# Patient Record
Sex: FEMALE | Race: BLACK OR AFRICAN AMERICAN
[De-identification: names, ages, dates, MRNs, and addresses within clinical notes are randomized per-mention and may not be internally consistent; named-entity substitution may affect disease eponyms.]

---

## 2019-08-24 ENCOUNTER — HOSPITAL ENCOUNTER (EMERGENCY)
Dept: HOSPITAL 47 - EC | Age: 29
LOS: 1 days | Discharge: HOME | End: 2019-08-25
Payer: COMMERCIAL

## 2019-08-24 VITALS — RESPIRATION RATE: 18 BRPM

## 2019-08-24 DIAGNOSIS — Z87.442: ICD-10-CM

## 2019-08-24 DIAGNOSIS — F31.9: ICD-10-CM

## 2019-08-24 DIAGNOSIS — F17.200: ICD-10-CM

## 2019-08-24 DIAGNOSIS — R10.812: ICD-10-CM

## 2019-08-24 DIAGNOSIS — F41.9: ICD-10-CM

## 2019-08-24 DIAGNOSIS — Z79.899: ICD-10-CM

## 2019-08-24 DIAGNOSIS — R10.9: Primary | ICD-10-CM

## 2019-08-24 DIAGNOSIS — R11.2: ICD-10-CM

## 2019-08-24 DIAGNOSIS — R68.83: ICD-10-CM

## 2019-08-24 LAB
ALBUMIN SERPL-MCNC: 4.9 G/DL (ref 3.5–5)
ALP SERPL-CCNC: 116 U/L (ref 38–126)
ALT SERPL-CCNC: 17 U/L (ref 9–52)
AMYLASE SERPL-CCNC: 94 U/L (ref 30–110)
ANION GAP SERPL CALC-SCNC: 13 MMOL/L
AST SERPL-CCNC: 29 U/L (ref 14–36)
BASOPHILS # BLD AUTO: 0 K/UL (ref 0–0.2)
BASOPHILS NFR BLD AUTO: 1 %
BUN SERPL-SCNC: 7 MG/DL (ref 7–17)
CALCIUM SPEC-MCNC: 9.9 MG/DL (ref 8.4–10.2)
CHLORIDE SERPL-SCNC: 107 MMOL/L (ref 98–107)
CO2 SERPL-SCNC: 21 MMOL/L (ref 22–30)
EOSINOPHIL # BLD AUTO: 0 K/UL (ref 0–0.7)
EOSINOPHIL NFR BLD AUTO: 1 %
ERYTHROCYTE [DISTWIDTH] IN BLOOD BY AUTOMATED COUNT: 4.61 M/UL (ref 3.8–5.4)
ERYTHROCYTE [DISTWIDTH] IN BLOOD: 19.3 % (ref 11.5–15.5)
GLUCOSE SERPL-MCNC: 130 MG/DL (ref 74–99)
HCT VFR BLD AUTO: 34.3 % (ref 34–46)
HGB BLD-MCNC: 10.5 GM/DL (ref 11.4–16)
KETONES UR QL STRIP.AUTO: (no result)
LYMPHOCYTES # SPEC AUTO: 0.5 K/UL (ref 1–4.8)
LYMPHOCYTES NFR SPEC AUTO: 11 %
MCH RBC QN AUTO: 22.7 PG (ref 25–35)
MCHC RBC AUTO-ENTMCNC: 30.5 G/DL (ref 31–37)
MCV RBC AUTO: 74.4 FL (ref 80–100)
MONOCYTES # BLD AUTO: 0.2 K/UL (ref 0–1)
MONOCYTES NFR BLD AUTO: 3 %
NEUTROPHILS # BLD AUTO: 3.9 K/UL (ref 1.3–7.7)
NEUTROPHILS NFR BLD AUTO: 82 %
PH UR: 7 [PH] (ref 5–8)
PLATELET # BLD AUTO: 328 K/UL (ref 150–450)
POTASSIUM SERPL-SCNC: 4 MMOL/L (ref 3.5–5.1)
PROT SERPL-MCNC: 9 G/DL (ref 6.3–8.2)
PROT UR QL: (no result)
RBC UR QL: 8 /HPF (ref 0–5)
SODIUM SERPL-SCNC: 141 MMOL/L (ref 137–145)
SP GR UR: 1.02 (ref 1–1.03)
SQUAMOUS UR QL AUTO: 5 /HPF (ref 0–4)
UROBILINOGEN UR QL STRIP: <2 MG/DL (ref ?–2)
WBC # BLD AUTO: 4.7 K/UL (ref 3.8–10.6)
WBC #/AREA URNS HPF: 1 /HPF (ref 0–5)

## 2019-08-24 PROCEDURE — 81001 URINALYSIS AUTO W/SCOPE: CPT

## 2019-08-24 PROCEDURE — 36415 COLL VENOUS BLD VENIPUNCTURE: CPT

## 2019-08-24 PROCEDURE — 85025 COMPLETE CBC W/AUTO DIFF WBC: CPT

## 2019-08-24 PROCEDURE — 74018 RADEX ABDOMEN 1 VIEW: CPT

## 2019-08-24 PROCEDURE — 80053 COMPREHEN METABOLIC PANEL: CPT

## 2019-08-24 PROCEDURE — 99284 EMERGENCY DEPT VISIT MOD MDM: CPT

## 2019-08-24 PROCEDURE — 96374 THER/PROPH/DIAG INJ IV PUSH: CPT

## 2019-08-24 PROCEDURE — 83690 ASSAY OF LIPASE: CPT

## 2019-08-24 PROCEDURE — 82150 ASSAY OF AMYLASE: CPT

## 2019-08-24 PROCEDURE — 74176 CT ABD & PELVIS W/O CONTRAST: CPT

## 2019-08-24 PROCEDURE — 96376 TX/PRO/DX INJ SAME DRUG ADON: CPT

## 2019-08-24 PROCEDURE — 96361 HYDRATE IV INFUSION ADD-ON: CPT

## 2019-08-24 PROCEDURE — 96375 TX/PRO/DX INJ NEW DRUG ADDON: CPT

## 2019-08-24 NOTE — CT
EXAM:

  CT Abdomen and Pelvis Without Intravenous Contrast

 

CLINICAL HISTORY:

  ITS.REASON CT Reason: Pain

 

TECHNIQUE:

  Axial computed tomography images of the abdomen and pelvis without 

intravenous contrast.  CTDI is 6 mGy and DLP is 310 mGy-cm.  This CT exam 

was performed using one or more of the following dose reduction 

techniques: automated exposure control, adjustment of the mA and/or kV 

according to patient size, and/or use of iterative reconstruction 

technique.

 

COMPARISON:

  No relevant prior studies available.

 

FINDINGS:

  Lung bases:  Unremarkable.  No mass.  No consolidation.

 

 ABDOMEN:

  Liver:  Unremarkable.

  Gallbladder and bile ducts:  Unremarkable.  No calcified stones.  No 

ductal dilation.

  Pancreas:  Unremarkable.  No ductal dilation.

  Spleen:  Unremarkable.  No splenomegaly.

  Adrenals:  Unremarkable.  No mass.

  Kidneys and ureters:  Dense medullary pyramids bilaterally.  No 

obstructing stones.  No hydronephrosis.

  Stomach and bowel:  Unremarkable.  No obstruction.  No mucosal 

thickening.

 

 PELVIS:

  Appendix:  No findings to suggest acute appendicitis.

  Bladder:  Unremarkable.  No stones.

  Reproductive:  Unremarkable as visualized.

 

 ABDOMEN and PELVIS:

  Intraperitoneal space:  Unremarkable.  No free air.  No significant 

fluid collection.

  Bones/joints:  No acute fracture.  No dislocation.

  Soft tissues:  Unremarkable.

  Vasculature:  Unremarkable.  No abdominal aortic aneurysm.

  Lymph nodes:  Unremarkable.  No enlarged lymph nodes.

 

IMPRESSION:     

No nephrolithiasis or hydronephrosis.

 

Findings suggestive of medullary nephrocalcinosis.

## 2019-08-24 NOTE — ED
Abdominal Pain HPI





- General


Chief Complaint: Abdominal Pain


Stated Complaint: abdominal pain


Time Seen by Provider: 19 20:04


Source: patient


Mode of arrival: EMS


Limitations: no limitations





- History of Present Illness


Initial Comments: 


29-year-old female patient coming from [facility] presents to 

the emergency department for evaluation of left flank pain.  Patient states that

she has been having symptoms since this morning.  Patient states she has been 

nauseated and has vomited several times.  States she has had chills but no 

fever.  Denies any known hematuria, dysuria, urinary frequency, urinary urgency.

 Patient states she does have history of kidney stones this does feel similar.  

States she is also having pain in the abdomen.  She denies any recent travel or 

sick contacts.  She is currently being treated for alcohol intoxication.  States

she has 15 days sober has been at the rehab facility for the last 5 days. Marcy beaulieu denies any recent rash, shortness breath, chest pain, diarrhea, 

constipation, numbness, tingling, dizziness, weakness, headache, visual changes,

or any other complaints.








- Related Data


                                Home Medications











 Medication  Instructions  Recorded  Confirmed


 


Acetaminophen [Tylenol 8 Hour] 650 mg PO Q4H PRN 19


 


Chlorpheniramine Maleate 4 mg PO Q4HR PRN 19





[Chlor-Trimeton]   


 


Hyoscyamine Sulfate [Levsin] 0.125 mg PO Q6H PRN 19


 


Ibuprofen [Motrin] 600 mg PO Q6H PRN 19


 


Multivitamins, Thera [Multivitamin 1 tab PO DAILY 19





(formulary)]   


 


Mylanta 30 ml PO Q4H PRN 19


 


Ondansetron HCl [Zofran] 8 mg PO Q6H PRN 19


 


Ondansetron [Zofran] 4 mg IM Q6H PRN 19


 


Pantoprazole [Protonix] 40 mg PO DAILY 19


 


Sucralfate [Carafate] 1 gm PO ACHS 19


 


Thiamine [Vitamin B-1] 100 mg PO DAILY 19


 


Tigan 300mg Supp 300 mg RECTAL Q6H PRN 19


 


Trimethobenzamide [Tigan] 300 mg PO Q6H PRN 19


 


busPIRone HCl [Buspar] 10 mg PO TID PRN 19


 


traZODone HCL [Desyrel] 50 - 150 mg PO HS 19











                                    Allergies











Allergy/AdvReac Type Severity Reaction Status Date / Time


 


No Known Allergies Allergy   Verified 19 20:44














Review of Systems


ROS Statement: 


Those systems with pertinent positive or pertinent negative responses have been 

documented in the HPI.





ROS Other: All systems not noted in ROS Statement are negative.





Past Medical History


Additional Past Medical History / Comment(s): kidney stones


History of Any Multi-Drug Resistant Organisms: None Reported


Past Surgical History:  Section


Additional Past Surgical History / Comment(s): kidney stents


Past Psychological History: Anxiety, Bipolar, Depression


Smoking Status: Current every day smoker


Past Alcohol Use History: Rare


Past Drug Use History: Marijuana





General Exam


Limitations: no limitations


General appearance: alert, in no apparent distress, other (Physical well-

developed, well-nourished adult female patient in mild distress related to pain.

  Vital signs upon presentation are temperature 98.9F, pulse 102, blood 

pressure 159/109.  respirations 22, pulse ox 100% on room air.)


ENT exam: Present: normal exam, normal oropharynx, mucous membranes moist


Respiratory exam: Present: normal lung sounds bilaterally.  Absent: respiratory 

distress, wheezes, rales, rhonchi, stridor


Cardiovascular Exam: Present: regular rate, normal rhythm, normal heart sounds. 

 Absent: systolic murmur, diastolic murmur, rubs, gallop, clicks


GI/Abdominal exam: Present: soft, tenderness (Left-sided), normal bowel sounds. 

 Absent: distended, guarding, rebound, rigid


Back exam: Present: normal inspection, CVA tenderness (L).  Absent: CVA 

tenderness (R)


Neurological exam: Present: alert, oriented X3, CN II-XII intact


Psychiatric exam: Present: normal affect, normal mood


Skin exam: Present: warm, dry, intact, normal color.  Absent: rash





Course


                                   Vital Signs











  19





  19:59 21:49


 


Temperature 98.9 F 


 


Pulse Rate 102 H 88


 


Respiratory 22 18





Rate  


 


Blood Pressure  159/109


 


O2 Sat by Pulse 100 100





Oximetry  














Medical Decision Making





- Medical Decision Making


29-year-old female patient presents the emergency department today for 

evaluation of left-sided flank pain, nausea, and vomiting.  Physical examination

 did reveal upper abdominal tenderness and left flank tenderness.  Labs reviewed

 and are relatively unremarkable.  Urinalysis showed a cloudy appearance with 1+

 protein, 3+ ketones, small amount of blood, 8 red blood cells, 5 squamous 

epithelial cells, rare amorphous sediment, rare bacteria, and occasional mucous.

  Lab findings not clearly related to kidney stones of CT abdomen and pelvis 

without contrast was obtained.  he stone was noted.  no other abnormalities were

 noted.  did inform patient of the finding of medullary nephrocalcinosis.  She 

is instructed to follow-up with her primary care physician for recheck in 1-2 

days.  She has several nausea medications as taken home, she is urged to 

continue these.  Return parameters were discussed in detail.  She verbalizes 

understanding and agrees with this plan.








- Lab Data


Result diagrams: 


                                 19 21:00





                                 19 21:00


                                   Lab Results











  19 Range/Units





  21:00 21:00 21:15 


 


WBC   4.7   (3.8-10.6)  k/uL


 


RBC   4.61   (3.80-5.40)  m/uL


 


Hgb   10.5 L   (11.4-16.0)  gm/dL


 


Hct   34.3   (34.0-46.0)  %


 


MCV   74.4 L   (80.0-100.0)  fL


 


MCH   22.7 L   (25.0-35.0)  pg


 


MCHC   30.5 L   (31.0-37.0)  g/dL


 


RDW   19.3 H   (11.5-15.5)  %


 


Plt Count   328   (150-450)  k/uL


 


Neutrophils %   82   %


 


Lymphocytes %   11   %


 


Monocytes %   3   %


 


Eosinophils %   1   %


 


Basophils %   1   %


 


Neutrophils #   3.9   (1.3-7.7)  k/uL


 


Lymphocytes #   0.5 L   (1.0-4.8)  k/uL


 


Monocytes #   0.2   (0-1.0)  k/uL


 


Eosinophils #   0.0   (0-0.7)  k/uL


 


Basophils #   0.0   (0-0.2)  k/uL


 


Hypochromasia   Marked   


 


Anisocytosis   Slight   


 


Microcytosis   Moderate   


 


Sodium  141    (137-145)  mmol/L


 


Potassium  4.0    (3.5-5.1)  mmol/L


 


Chloride  107    ()  mmol/L


 


Carbon Dioxide  21 L    (22-30)  mmol/L


 


Anion Gap  13    mmol/L


 


BUN  7    (7-17)  mg/dL


 


Creatinine  0.51 L    (0.52-1.04)  mg/dL


 


Est GFR (CKD-EPI)AfAm  >90    (>60 ml/min/1.73 sqM)  


 


Est GFR (CKD-EPI)NonAf  >90    (>60 ml/min/1.73 sqM)  


 


Glucose  130 H    (74-99)  mg/dL


 


Calcium  9.9    (8.4-10.2)  mg/dL


 


Total Bilirubin  0.4    (0.2-1.3)  mg/dL


 


AST  29    (14-36)  U/L


 


ALT  17    (9-52)  U/L


 


Alkaline Phosphatase  116    ()  U/L


 


Total Protein  9.0 H    (6.3-8.2)  g/dL


 


Albumin  4.9    (3.5-5.0)  g/dL


 


Amylase  94    ()  U/L


 


Lipase  55    ()  U/L


 


Urine Color    Yellow  


 


Urine Appearance    Cloudy H  (Clear)  


 


Urine pH    7.0  (5.0-8.0)  


 


Ur Specific Gravity    1.018  (1.001-1.035)  


 


Urine Protein    1+ H  (Negative)  


 


Urine Glucose (UA)    Negative  (Negative)  


 


Urine Ketones    3+ H  (Negative)  


 


Urine Blood    Small H  (Negative)  


 


Urine Nitrite    Negative  (Negative)  


 


Urine Bilirubin    Negative  (Negative)  


 


Urine Urobilinogen    <2.0  (<2.0)  mg/dL


 


Ur Leukocyte Esterase    Negative  (Negative)  


 


Urine RBC    8 H  (0-5)  /hpf


 


Urine WBC    1  (0-5)  /hpf


 


Ur Squamous Epith Cells    5 H  (0-4)  /hpf


 


Amorphous Sediment    Rare H  (None)  /hpf


 


Urine Bacteria    Rare H  (None)  /hpf


 


Urine Mucus    Occasional H  (None)  /hpf














- Radiology Data


Radiology results: report reviewed, image reviewed


KUB x-ray of the abdomen is obtained.  Report was reviewed in its entirety.  

Impression by Dr. Lawson shows nonacute abdomen.





CT abdomen and pelvis without contrast is obtained.  Report was reviewed in its 

entirety.  Impression by  shows no nephrolithiasis or hydronephrosis.  

Findings suggestive of medullary nephrocalcinosis.





Disposition


Clinical Impression: 


 Flank pain





Disposition: HOME SELF-CARE


Condition: Good


Instructions (If sedation given, give patient instructions):  Abdominal Pain 

(ED), Flank Pain (ED)


Additional Instructions: 


Take, Motrin for pain control.  Follow-up through primary care physician for 

recheck in 1-2 days.  Return to the emergency department immediately for any 

new, worsening, or concerning symptoms.


Is patient prescribed a controlled substance at d/c from ED?: No


Referrals: 


None,Stated [Primary Care Provider] - 1-2 days


Time of Disposition: 00:05

## 2019-08-24 NOTE — XR
EXAMINATION TYPE: XR KUB

 

DATE OF EXAM: 8/24/2019

 

COMPARISON: NONE

 

HISTORY: Abdominal pain and vomiting

 

TECHNIQUE: Single view

 

FINDINGS: Bowel gas pattern is normal. There is no sign of intestinal obstruction or pneumoperitoneum
. Fecal pattern is normal. Lung bases are clear. There are no pathologic calcifications.

 

IMPRESSION: Nonacute abdomen.

## 2019-08-25 ENCOUNTER — HOSPITAL ENCOUNTER (EMERGENCY)
Dept: HOSPITAL 47 - EC | Age: 29
Discharge: HOME | End: 2019-08-25
Payer: COMMERCIAL

## 2019-08-25 VITALS — RESPIRATION RATE: 18 BRPM | HEART RATE: 100 BPM

## 2019-08-25 VITALS — SYSTOLIC BLOOD PRESSURE: 124 MMHG | DIASTOLIC BLOOD PRESSURE: 81 MMHG | TEMPERATURE: 97.9 F

## 2019-08-25 VITALS — SYSTOLIC BLOOD PRESSURE: 123 MMHG | TEMPERATURE: 98.5 F | DIASTOLIC BLOOD PRESSURE: 84 MMHG | HEART RATE: 100 BPM

## 2019-08-25 DIAGNOSIS — F41.9: Primary | ICD-10-CM

## 2019-08-25 DIAGNOSIS — F32.9: ICD-10-CM

## 2019-08-25 DIAGNOSIS — Z79.899: ICD-10-CM

## 2019-08-25 PROCEDURE — 96372 THER/PROPH/DIAG INJ SC/IM: CPT

## 2019-08-25 PROCEDURE — 99283 EMERGENCY DEPT VISIT LOW MDM: CPT
